# Patient Record
Sex: MALE | URBAN - METROPOLITAN AREA
[De-identification: names, ages, dates, MRNs, and addresses within clinical notes are randomized per-mention and may not be internally consistent; named-entity substitution may affect disease eponyms.]

---

## 2017-10-02 ENCOUNTER — APPOINTMENT (RX ONLY)
Dept: URBAN - METROPOLITAN AREA CLINIC 41 | Facility: CLINIC | Age: 37
Setting detail: DERMATOLOGY
End: 2017-10-02

## 2017-10-02 DIAGNOSIS — L21.8 OTHER SEBORRHEIC DERMATITIS: ICD-10-CM

## 2017-10-02 DIAGNOSIS — Z71.89 OTHER SPECIFIED COUNSELING: ICD-10-CM

## 2017-10-02 DIAGNOSIS — D22 MELANOCYTIC NEVI: ICD-10-CM

## 2017-10-02 PROBLEM — L29.8 OTHER PRURITUS: Status: ACTIVE | Noted: 2017-10-02

## 2017-10-02 PROBLEM — L85.3 XEROSIS CUTIS: Status: ACTIVE | Noted: 2017-10-02

## 2017-10-02 PROBLEM — L20.84 INTRINSIC (ALLERGIC) ECZEMA: Status: ACTIVE | Noted: 2017-10-02

## 2017-10-02 PROBLEM — L55.1 SUNBURN OF SECOND DEGREE: Status: ACTIVE | Noted: 2017-10-02

## 2017-10-02 PROBLEM — D23.61 OTHER BENIGN NEOPLASM OF SKIN OF RIGHT UPPER LIMB, INCLUDING SHOULDER: Status: ACTIVE | Noted: 2017-10-02

## 2017-10-02 PROBLEM — D23.5 OTHER BENIGN NEOPLASM OF SKIN OF TRUNK: Status: ACTIVE | Noted: 2017-10-02

## 2017-10-02 PROBLEM — D22.5 MELANOCYTIC NEVI OF TRUNK: Status: ACTIVE | Noted: 2017-10-02

## 2017-10-02 PROCEDURE — ? COUNSELING

## 2017-10-02 PROCEDURE — ? OTHER

## 2017-10-02 PROCEDURE — 99202 OFFICE O/P NEW SF 15 MIN: CPT

## 2017-10-02 PROCEDURE — ? TREATMENT REGIMEN

## 2017-10-02 PROCEDURE — ? OBSERVATION

## 2017-10-02 ASSESSMENT — LOCATION SIMPLE DESCRIPTION DERM
LOCATION SIMPLE: CHEST
LOCATION SIMPLE: LEFT CLAVICULAR SKIN

## 2017-10-02 ASSESSMENT — LOCATION ZONE DERM: LOCATION ZONE: TRUNK

## 2017-10-02 ASSESSMENT — LOCATION DETAILED DESCRIPTION DERM
LOCATION DETAILED: LEFT CLAVICULAR SKIN
LOCATION DETAILED: LEFT MEDIAL INFERIOR CHEST
LOCATION DETAILED: STERNUM

## 2017-10-02 NOTE — PROCEDURE: OBSERVATION
Size Of Lesion In Cm (Optional): 0.7
Detail Level: Detailed
X Size Of Lesion In Cm (Optional): 0
Size Of Lesion In Cm (Optional): 0.5

## 2017-10-02 NOTE — HPI: SKIN LESION
Is This A New Presentation, Or A Follow-Up?: Skin Lesion
How Severe Is Your Skin Lesion?: moderate
Has Your Skin Lesion Been Treated?: not been treated
Additional History: Patient also has some dry skin on his chest.

## 2017-10-02 NOTE — PROCEDURE: OTHER
Other (Free Text): No suspicious lesions present. Pt taking injectable medications for MS. Aware to monitor for changes in nevi or new lesions presenting.
Detail Level: Detailed
Note Text (......Xxx Chief Complaint.): This diagnosis correlates with the